# Patient Record
Sex: FEMALE | Race: WHITE | NOT HISPANIC OR LATINO | Employment: STUDENT | URBAN - METROPOLITAN AREA
[De-identification: names, ages, dates, MRNs, and addresses within clinical notes are randomized per-mention and may not be internally consistent; named-entity substitution may affect disease eponyms.]

---

## 2022-03-08 ENCOUNTER — OFFICE VISIT (OUTPATIENT)
Dept: URGENT CARE | Facility: CLINIC | Age: 22
End: 2022-03-08
Payer: COMMERCIAL

## 2022-03-08 VITALS
DIASTOLIC BLOOD PRESSURE: 72 MMHG | HEART RATE: 93 BPM | RESPIRATION RATE: 18 BRPM | SYSTOLIC BLOOD PRESSURE: 103 MMHG | TEMPERATURE: 99 F | OXYGEN SATURATION: 98 %

## 2022-03-08 DIAGNOSIS — L03.115 CELLULITIS OF RIGHT LOWER EXTREMITY: Primary | ICD-10-CM

## 2022-03-08 PROCEDURE — 99203 PR OFFICE/OUTPT VISIT, NEW, LEVL III, 30-44 MIN: ICD-10-PCS | Mod: S$GLB,,,

## 2022-03-08 PROCEDURE — 99203 OFFICE O/P NEW LOW 30 MIN: CPT | Mod: S$GLB,,,

## 2022-03-08 RX ORDER — DOXAZOSIN 2 MG/1
2 TABLET ORAL DAILY
COMMUNITY
Start: 2021-11-09

## 2022-03-08 RX ORDER — LAMOTRIGINE 200 MG/1
200 TABLET ORAL DAILY
COMMUNITY
Start: 2021-11-09

## 2022-03-08 RX ORDER — CEPHALEXIN 500 MG/1
500 CAPSULE ORAL EVERY 6 HOURS
Qty: 28 CAPSULE | Refills: 0 | Status: SHIPPED | OUTPATIENT
Start: 2022-03-08 | End: 2022-03-15

## 2022-03-08 RX ORDER — ESCITALOPRAM OXALATE 10 MG/1
TABLET ORAL
COMMUNITY

## 2022-03-08 RX ORDER — CYANOCOBALAMIN (VITAMIN B-12) 500 MCG
TABLET ORAL
COMMUNITY

## 2022-03-08 RX ORDER — DEXMETHYLPHENIDATE HYDROCHLORIDE 10 MG/1
10 CAPSULE, EXTENDED RELEASE ORAL EVERY MORNING
COMMUNITY
Start: 2021-11-09

## 2022-03-08 NOTE — PROGRESS NOTES
Subjective:       Patient ID: Madison Montero is a 21 y.o. female.    Vitals:  temperature is 98.7 °F (37.1 °C). Her blood pressure is 103/72 and her pulse is 93. Her respiration is 18 and oxygen saturation is 98%.     Chief Complaint: Knee Injury    Pt presents with complaint of knee injury x1 week.  Pt states her scab looks to be infected.  Pt states she had mupirocin and has been applying it to the affected area.  Pt states the wound has been getting worse and says she saw puss coming from the area.  Patient states that she fell on 1 day or all while she was intoxicated.  She landed on her right knee.  She was able to walk after the incident and Deeney realized that she had caught her knee.  It was not until about an hour later at that she felt something wet on her leg and noticed that her leg was bleeding.  She has been cleaning the area with warm water and soap.  She has been putting mupirocin on the wound 2 times per day.  She did not put any mupirocin on the wound today because she wants to let it dry out.  She states that the wound is tender and warm to touch.  She denies any pain with walking.  She has full range of motion the right knee.  She denies any fevers body aches or chills.  Patient states that her tetanus shot is up to date.     Knee Injury  This is a new problem. The current episode started in the past 7 days. The problem occurs constantly. The problem has been unchanged. Pertinent negatives include no chest pain, chills, coughing, diaphoresis, fatigue, fever, headaches, joint swelling, nausea, neck pain, sore throat or vomiting. Nothing aggravates the symptoms. Treatments tried: mupirocin. The treatment provided mild relief.       Constitution: Negative for chills, sweating, fatigue and fever.   HENT: Negative for ear pain, postnasal drip, sinus pain, sinus pressure and sore throat.    Neck: Negative for neck pain and neck stiffness.   Cardiovascular: Negative for chest pain.   Eyes: Negative  for eye itching, eye pain and eye redness.   Respiratory: Negative for chest tightness, cough and sputum production.    Gastrointestinal: Negative for nausea, vomiting, constipation and diarrhea.   Musculoskeletal: Positive for trauma. Negative for joint swelling and abnormal ROM of joint.   Skin: Positive for abrasion and erythema. Negative for hives.   Allergic/Immunologic: Negative for hives and itching.   Neurological: Negative for dizziness, headaches, disorientation and altered mental status.   Psychiatric/Behavioral: Negative for altered mental status, disorientation and confusion.       Objective:      Physical Exam   Constitutional: She is oriented to person, place, and time. She appears well-developed.   HENT:   Head: Normocephalic and atraumatic. Head is without abrasion, without contusion and without laceration.   Ears:   Right Ear: External ear normal.   Left Ear: External ear normal.   Nose: Nose normal.   Mouth/Throat: Oropharynx is clear and moist and mucous membranes are normal.   Eyes: Conjunctivae, EOM and lids are normal. Pupils are equal, round, and reactive to light.   Neck: Trachea normal and phonation normal. Neck supple.   Cardiovascular: Normal rate, regular rhythm and normal heart sounds.   Pulmonary/Chest: Effort normal and breath sounds normal. No stridor. No respiratory distress.   Musculoskeletal: Normal range of motion.         General: Normal range of motion.   Neurological: She is alert and oriented to person, place, and time.   Skin: Skin is warm, dry, rash, not urticarial, pustular, not vesicular and no abscessed. Capillary refill takes less than 2 seconds. abrasion, erythema and lesion No burn, No bruising and No ecchymosis        Psychiatric: Her speech is normal and behavior is normal. Judgment and thought content normal.   Nursing note and vitals reviewed.            Assessment:       1. Cellulitis of right lower extremity          Plan:         Cellulitis of right lower  extremity  -     cephALEXin (KEFLEX) 500 MG capsule; Take 1 capsule (500 mg total) by mouth every 6 (six) hours. for 7 days  Dispense: 28 capsule; Refill: 0                 Patient Instructions   -start oral antibiotic for skin infection.  -try purchasing mederma over-the-counter to help with scarring.  -keep wound covered in public places.  Keep wound open while your home.  -clean wound 2 times a day with warm water and mild soap.  -return to urgent care if symptoms worsen.    - Rest.    - Drink plenty of fluids.    - Acetaminophen (tylenol) or Ibuprofen (advil,motrin) as directed as needed for fever/pain. Avoid tylenol if you have a history of liver disease. Do not take ibuprofen if you have a history of GI bleeding, kidney disease, or if you take blood thinners.     - You must understand that you have received an Urgent Care treatment only and that you may be released before all of your medical problems are known or treated.   - You, the patient, will arrange for follow up care as instructed.   - If your condition worsens or fails to improve we recommend that you receive another evaluation at the ER immediately or contact your PCP to discuss your concerns or return here.   - Follow up with your PCP or specialty clinic as directed in the next 1-2 weeks if not improved or as needed.  You can call [phone] to schedule an appointment with the appropriate provider.    If your symptoms do not improve or worsen, go to the emergency room immediately.

## 2022-03-09 NOTE — PATIENT INSTRUCTIONS
-start oral antibiotic for skin infection.  -try purchasing mederma over-the-counter to help with scarring.  -keep wound covered in public places.  Keep wound open while your home.  -clean wound 2 times a day with warm water and mild soap.  -return to urgent care if symptoms worsen.    - Rest.    - Drink plenty of fluids.    - Acetaminophen (tylenol) or Ibuprofen (advil,motrin) as directed as needed for fever/pain. Avoid tylenol if you have a history of liver disease. Do not take ibuprofen if you have a history of GI bleeding, kidney disease, or if you take blood thinners.     - You must understand that you have received an Urgent Care treatment only and that you may be released before all of your medical problems are known or treated.   - You, the patient, will arrange for follow up care as instructed.   - If your condition worsens or fails to improve we recommend that you receive another evaluation at the ER immediately or contact your PCP to discuss your concerns or return here.   - Follow up with your PCP or specialty clinic as directed in the next 1-2 weeks if not improved or as needed.  You can call [phone] to schedule an appointment with the appropriate provider.    If your symptoms do not improve or worsen, go to the emergency room immediately.